# Patient Record
Sex: MALE | Race: WHITE
[De-identification: names, ages, dates, MRNs, and addresses within clinical notes are randomized per-mention and may not be internally consistent; named-entity substitution may affect disease eponyms.]

---

## 2016-02-02 VITALS
SYSTOLIC BLOOD PRESSURE: 167 MMHG | SYSTOLIC BLOOD PRESSURE: 167 MMHG | SYSTOLIC BLOOD PRESSURE: 167 MMHG | DIASTOLIC BLOOD PRESSURE: 51 MMHG | SYSTOLIC BLOOD PRESSURE: 167 MMHG | DIASTOLIC BLOOD PRESSURE: 51 MMHG | SYSTOLIC BLOOD PRESSURE: 167 MMHG | DIASTOLIC BLOOD PRESSURE: 51 MMHG | DIASTOLIC BLOOD PRESSURE: 51 MMHG | DIASTOLIC BLOOD PRESSURE: 51 MMHG

## 2017-01-17 ENCOUNTER — HOSPITAL ENCOUNTER (OUTPATIENT)
Dept: HOSPITAL 19 - ZLAB.WCH | Age: 82
End: 2017-01-17

## 2017-01-17 ENCOUNTER — HOSPITAL ENCOUNTER (OUTPATIENT)
Dept: HOSPITAL 6 - LAB | Age: 82
End: 2017-01-17
Payer: MEDICARE

## 2017-01-17 DIAGNOSIS — D64.89: Primary | ICD-10-CM

## 2017-01-17 DIAGNOSIS — E53.8: ICD-10-CM

## 2017-01-17 DIAGNOSIS — Z01.89: Primary | ICD-10-CM

## 2017-01-17 DIAGNOSIS — Z12.5: ICD-10-CM

## 2017-01-17 DIAGNOSIS — E78.2: ICD-10-CM

## 2017-01-17 DIAGNOSIS — I44.1: ICD-10-CM

## 2017-01-17 PROCEDURE — G0103 PSA SCREENING: HCPCS

## 2017-05-10 ENCOUNTER — HOSPITAL ENCOUNTER (OUTPATIENT)
Dept: HOSPITAL 6 - LAB | Age: 82
End: 2017-05-10
Payer: MEDICARE

## 2017-05-10 DIAGNOSIS — I70.213: Primary | ICD-10-CM

## 2017-05-16 ENCOUNTER — HOSPITAL ENCOUNTER (OUTPATIENT)
Dept: HOSPITAL 6 - LAB | Age: 82
End: 2017-05-16
Payer: MEDICARE

## 2017-05-16 DIAGNOSIS — I70.213: Primary | ICD-10-CM

## 2017-05-22 ENCOUNTER — HOSPITAL ENCOUNTER (OUTPATIENT)
Dept: HOSPITAL 6 - LAB | Age: 82
End: 2017-05-22
Payer: MEDICARE

## 2017-05-22 DIAGNOSIS — I70.213: Primary | ICD-10-CM

## 2017-12-15 ENCOUNTER — HOSPITAL ENCOUNTER (OUTPATIENT)
Dept: HOSPITAL 6 - LAB | Age: 82
End: 2017-12-15
Attending: INTERNAL MEDICINE
Payer: MEDICARE

## 2017-12-15 DIAGNOSIS — Z12.11: ICD-10-CM

## 2017-12-15 DIAGNOSIS — R73.09: ICD-10-CM

## 2017-12-15 DIAGNOSIS — E78.2: ICD-10-CM

## 2017-12-15 DIAGNOSIS — Z12.5: ICD-10-CM

## 2017-12-15 DIAGNOSIS — D64.9: ICD-10-CM

## 2017-12-15 DIAGNOSIS — R41.3: ICD-10-CM

## 2017-12-15 DIAGNOSIS — E53.8: ICD-10-CM

## 2017-12-15 DIAGNOSIS — F03.90: ICD-10-CM

## 2017-12-15 DIAGNOSIS — G93.40: Primary | ICD-10-CM

## 2017-12-15 LAB
ALBUMIN SERPL-MCNC: 4.1 G/DL (ref 3.5–5)
ALT SERPL-CCNC: 34 U/L (ref 21–72)
APPEARANCE UR: CLEAR
AST SERPL-CCNC: 56 U/L (ref 17–59)
BILIRUB SERPL-MCNC: 0.5 MG/DL (ref 0.2–1.3)
BILIRUB UR QL STRIP.AUTO: NEGATIVE
BUN/CREAT SERPL: 19.9 (ref 6–26)
CALCIUM SERPL-MCNC: 9.4 MG/DL (ref 8.4–10.2)
CO2 SERPL-SCNC: 31 MMOL/L (ref 22–30)
COLOR UR AUTO: YELLOW
ERYTHROCYTE [DISTWIDTH] IN BLOOD BY AUTOMATED COUNT: 12.3 % (ref 11.5–14.5)
ERYTHROCYTE [SEDIMENTATION RATE] IN BLOOD: 12 MM/HR (ref 0–20)
GLUCOSE SERPL-MCNC: 83 MG/DL (ref 75–110)
GLUCOSE UR QL STRIP.AUTO: NEGATIVE MG/DL
HCT VFR BLD AUTO: 39.3 % (ref 42–52)
HGB BLD-MCNC: 13 G/DL (ref 13.5–18)
KETONES UR QL STRIP.AUTO: NEGATIVE
LEUKOCYTE ESTERASE UR QL STRIP: NEGATIVE
LYMPHOCYTES NFR BLD MANUAL: 29 % (ref 20–51)
MCH RBC QN AUTO: 30 PG (ref 27–31)
MCHC RBC AUTO-ENTMCNC: 33 G/DL (ref 33–37)
MCV RBC AUTO: 92 FL (ref 78–100)
MONOCYTES NFR BLD: 9 % (ref 3–10)
NEUTS SEG NFR BLD MANUAL: 60 % (ref 42–75)
NITRITE UR QL STRIP: NEGATIVE
PH UR STRIP.AUTO: 5 [PH] (ref 5–8)
PLATELET # BLD AUTO: 200 K/MM3 (ref 130–400)
PMV BLD AUTO: 10.4 FL (ref 7.4–10.4)
POTASSIUM SERPL-SCNC: 4.6 MMOL/L (ref 3.6–5)
PROT ?TM UR-MCNC: NEGATIVE MG/DL
PROT SERPL-MCNC: 7.2 G/DL (ref 6.3–8.2)
RBC # BLD AUTO: 4.28 M/MM3 (ref 4.2–5.6)
RBC UR QL AUTO: NEGATIVE
SODIUM SERPL-SCNC: 141 MMOL/L (ref 137–145)
SP GR UR STRIP.AUTO: 1.02 (ref 1–1.03)
UROBILINOGEN UR-MCNC: NORMAL MG/DL
WBC # BLD AUTO: 5.3 K/MM3 (ref 4.8–10.8)
WBC #/AREA URNS HPF: (no result) /HPF (ref 0–3)

## 2017-12-16 ENCOUNTER — HOSPITAL ENCOUNTER (OUTPATIENT)
Dept: HOSPITAL 19 - ZLAB.WCH | Age: 82
End: 2017-12-16

## 2017-12-16 DIAGNOSIS — Z01.89: Primary | ICD-10-CM

## 2017-12-16 LAB
PSA SERPL-MCNC: 4.71 NG/ML (ref 0–4)
TSH SERPL DL<=0.005 MIU/L-ACNC: 2.07 UIU/ML (ref 0.47–4.68)

## 2017-12-16 PROCEDURE — G0103 PSA SCREENING: HCPCS

## 2018-12-18 ENCOUNTER — HOSPITAL ENCOUNTER (OUTPATIENT)
Dept: HOSPITAL 19 - ZLAB.WCH | Age: 83
End: 2018-12-18

## 2018-12-18 DIAGNOSIS — Z01.89: Primary | ICD-10-CM

## 2018-12-18 LAB
FERRITIN SERPL-MCNC: 44 NG/ML (ref 18–464)
IRON SERPL-MCNC: 80 UG/DL (ref 35–150)
PSA SERPL-MCNC: 5.06 NG/ML (ref 0–4)
TIBC SERPL-MCNC: 290 UG/DL (ref 261–462)

## 2018-12-18 PROCEDURE — G0103 PSA SCREENING: HCPCS

## 2019-01-28 ENCOUNTER — HOSPITAL ENCOUNTER (OUTPATIENT)
Dept: HOSPITAL 6 - LAB | Age: 84
End: 2019-01-28
Attending: INTERNAL MEDICINE
Payer: MEDICARE

## 2019-01-28 ENCOUNTER — HOSPITAL ENCOUNTER (OUTPATIENT)
Dept: HOSPITAL 19 - ZLAB.WCH | Age: 84
End: 2019-01-28

## 2019-01-28 DIAGNOSIS — Z01.89: Primary | ICD-10-CM

## 2019-01-28 DIAGNOSIS — F03.90: ICD-10-CM

## 2019-01-28 DIAGNOSIS — Z95.0: ICD-10-CM

## 2019-01-28 DIAGNOSIS — R06.02: Primary | ICD-10-CM

## 2019-01-28 DIAGNOSIS — G93.40: ICD-10-CM

## 2019-01-28 DIAGNOSIS — E03.4: ICD-10-CM

## 2019-01-28 DIAGNOSIS — I44.30: ICD-10-CM

## 2019-01-28 LAB
ALBUMIN SERPL-MCNC: 4.4 G/DL (ref 3.5–5)
ALT SERPL-CCNC: 27 U/L (ref 21–72)
APPEARANCE UR: CLEAR
AST SERPL-CCNC: 27 U/L (ref 17–59)
BASOPHILS # BLD: 0 10*3/UL (ref 0.02–0.1)
BILIRUB SERPL-MCNC: 0.4 MG/DL (ref 0.2–1.3)
BILIRUB UR QL STRIP.AUTO: NEGATIVE
CALCIUM SERPL-MCNC: 9.5 MG/DL (ref 8.4–10.2)
CO2 SERPL-SCNC: 29 MMOL/L (ref 22–30)
COLOR UR AUTO: YELLOW
EOSINOPHIL # BLD: 0.2 10*3/UL (ref 0.04–0.4)
EOSINOPHIL NFR BLD: 3.3 % (ref 0–4)
ERYTHROCYTE [DISTWIDTH] IN BLOOD BY AUTOMATED COUNT: 12.8 % (ref 11.5–14.5)
ERYTHROCYTE [SEDIMENTATION RATE] IN BLOOD: 10 MM/HR (ref 0–20)
GLUCOSE SERPL-MCNC: 114 MG/DL (ref 75–110)
GLUCOSE UR QL STRIP.AUTO: NEGATIVE MG/DL
HCT VFR BLD AUTO: 42.4 % (ref 42–52)
HGB BLD-MCNC: 13.6 G/DL (ref 13.5–18)
KETONES UR QL STRIP.AUTO: NEGATIVE
LEUKOCYTE ESTERASE UR QL STRIP: NEGATIVE
LYMPHOCYTES # BLD: 1.4 10*3/UL (ref 1.5–4)
MCH RBC QN AUTO: 29 PG (ref 27–31)
MCHC RBC AUTO-ENTMCNC: 32 G/DL (ref 33–37)
MCV RBC AUTO: 91 FL (ref 78–100)
MONOCYTES # BLD: 0.6 10*3/UL (ref 0.2–0.8)
NEUTROPHILS # BLD: 3 10*3/UL (ref 1.4–6.5)
NITRITE UR QL STRIP: NEGATIVE
PH UR STRIP.AUTO: 5 [PH] (ref 5–8)
PLATELET # BLD AUTO: 188 K/MM3 (ref 130–400)
PMV BLD AUTO: 10 FL (ref 7.4–10.4)
POTASSIUM SERPL-SCNC: 4.1 MMOL/L (ref 3.6–5)
PROT ?TM UR-MCNC: (no result) MG/DL
PROT SERPL-MCNC: 7.6 G/DL (ref 6.3–8.2)
RBC # BLD AUTO: 4.64 M/MM3 (ref 4.2–5.6)
RBC UR QL AUTO: (no result)
SODIUM SERPL-SCNC: 141 MMOL/L (ref 137–145)
SP GR UR STRIP.AUTO: 1.03 (ref 1–1.03)
UROBILINOGEN UR-MCNC: NORMAL MG/DL
WBC # BLD AUTO: 5.2 K/MM3 (ref 4.8–10.8)
WBC #/AREA URNS HPF: (no result) /HPF (ref 0–3)

## 2019-11-11 VITALS — SYSTOLIC BLOOD PRESSURE: 163 MMHG | DIASTOLIC BLOOD PRESSURE: 85 MMHG

## 2020-07-14 ENCOUNTER — HOSPITAL ENCOUNTER (OUTPATIENT)
Dept: HOSPITAL 6 - RAD | Age: 85
End: 2020-07-14
Payer: MEDICARE

## 2020-07-14 DIAGNOSIS — G45.9: Primary | ICD-10-CM

## 2020-07-28 ENCOUNTER — HOSPITAL ENCOUNTER (INPATIENT)
Dept: HOSPITAL 19 - SURG | Age: 85
LOS: 6 days | Discharge: NURSING, REHAB OR LONG-TERM CARE | DRG: 469 | End: 2020-08-03
Attending: INTERNAL MEDICINE | Admitting: INTERNAL MEDICINE
Payer: MEDICARE

## 2020-07-28 VITALS — SYSTOLIC BLOOD PRESSURE: 118 MMHG | DIASTOLIC BLOOD PRESSURE: 52 MMHG | HEART RATE: 72 BPM

## 2020-07-28 VITALS — DIASTOLIC BLOOD PRESSURE: 60 MMHG | HEART RATE: 71 BPM | SYSTOLIC BLOOD PRESSURE: 121 MMHG

## 2020-07-28 VITALS
DIASTOLIC BLOOD PRESSURE: 65 MMHG | SYSTOLIC BLOOD PRESSURE: 126 MMHG | SYSTOLIC BLOOD PRESSURE: 126 MMHG | DIASTOLIC BLOOD PRESSURE: 65 MMHG

## 2020-07-28 VITALS — HEART RATE: 64 BPM | DIASTOLIC BLOOD PRESSURE: 49 MMHG | SYSTOLIC BLOOD PRESSURE: 122 MMHG | TEMPERATURE: 98 F

## 2020-07-28 VITALS — HEART RATE: 72 BPM | SYSTOLIC BLOOD PRESSURE: 121 MMHG | TEMPERATURE: 98 F | DIASTOLIC BLOOD PRESSURE: 56 MMHG

## 2020-07-28 VITALS — DIASTOLIC BLOOD PRESSURE: 65 MMHG | SYSTOLIC BLOOD PRESSURE: 117 MMHG | HEART RATE: 96 BPM

## 2020-07-28 VITALS — DIASTOLIC BLOOD PRESSURE: 53 MMHG | SYSTOLIC BLOOD PRESSURE: 126 MMHG | HEART RATE: 86 BPM | TEMPERATURE: 99.8 F

## 2020-07-28 VITALS — TEMPERATURE: 98 F | DIASTOLIC BLOOD PRESSURE: 52 MMHG | SYSTOLIC BLOOD PRESSURE: 126 MMHG | HEART RATE: 72 BPM

## 2020-07-28 VITALS — DIASTOLIC BLOOD PRESSURE: 58 MMHG | HEART RATE: 77 BPM | SYSTOLIC BLOOD PRESSURE: 127 MMHG

## 2020-07-28 VITALS — HEART RATE: 63 BPM | SYSTOLIC BLOOD PRESSURE: 113 MMHG | DIASTOLIC BLOOD PRESSURE: 51 MMHG | TEMPERATURE: 98 F

## 2020-07-28 VITALS — HEIGHT: 60 IN | WEIGHT: 168.21 LBS

## 2020-07-28 VITALS — TEMPERATURE: 98.3 F | SYSTOLIC BLOOD PRESSURE: 106 MMHG | HEART RATE: 65 BPM | DIASTOLIC BLOOD PRESSURE: 50 MMHG

## 2020-07-28 DIAGNOSIS — N18.9: ICD-10-CM

## 2020-07-28 DIAGNOSIS — R50.9: ICD-10-CM

## 2020-07-28 DIAGNOSIS — S72.001A: Primary | ICD-10-CM

## 2020-07-28 DIAGNOSIS — F03.90: ICD-10-CM

## 2020-07-28 DIAGNOSIS — Z95.0: ICD-10-CM
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
## 2020-07-28 DIAGNOSIS — E44.0: ICD-10-CM

## 2020-07-28 DIAGNOSIS — N17.9: ICD-10-CM

## 2020-07-28 DIAGNOSIS — F32.9: ICD-10-CM

## 2020-07-28 DIAGNOSIS — I49.5: ICD-10-CM

## 2020-07-28 DIAGNOSIS — F41.9: ICD-10-CM

## 2020-07-28 DIAGNOSIS — J96.01: ICD-10-CM

## 2020-07-28 DIAGNOSIS — Z87.891: ICD-10-CM

## 2020-07-28 DIAGNOSIS — D64.9: ICD-10-CM

## 2020-07-28 PROCEDURE — C1713 ANCHOR/SCREW BN/BN,TIS/BN: HCPCS

## 2020-07-28 PROCEDURE — C1776 JOINT DEVICE (IMPLANTABLE): HCPCS

## 2020-07-28 PROCEDURE — P9016 RBC LEUKOCYTES REDUCED: HCPCS

## 2020-07-28 PROCEDURE — 0SRR0JA REPLACEMENT OF RIGHT HIP JOINT, FEMORAL SURFACE WITH SYNTHETIC SUBSTITUTE, UNCEMENTED, OPEN APPROACH: ICD-10-PCS | Performed by: ORTHOPAEDIC SURGERY

## 2020-07-28 PROCEDURE — A9284 NON-ELECTRONIC SPIROMETER: HCPCS

## 2020-07-28 NOTE — NUR
PT ADMITTED TO ROOM 345 PER EMS FROM [place]. CLEARED BY HOSPITALIST AND
NOTIFIED DR. HARPER. CONSENT OBTAINED FROM MARTINEZ BOWEN' WIFE AND DPOA. PT TO
SURGERY AT THIS TIME PER BED WITH MYLENE RN PACU.

## 2020-07-28 NOTE — NUR
PT TO ROOM 345 PER BED WITH REPORT FROM FABRICIO PENG @[id number]. PT IS DROWSEY BUT
AROUSES TO VERBAL, BASELINE DEMENTIA NOT ABLE TO ANSWER QUESTIONS. VSS,
DRESSING TO RIGHT HIP CDI WITH BULKY DRESSING IN PLACE.

## 2020-07-29 VITALS — DIASTOLIC BLOOD PRESSURE: 55 MMHG | HEART RATE: 83 BPM | SYSTOLIC BLOOD PRESSURE: 116 MMHG | TEMPERATURE: 100.4 F

## 2020-07-29 VITALS — HEART RATE: 104 BPM | TEMPERATURE: 98.1 F | DIASTOLIC BLOOD PRESSURE: 47 MMHG | SYSTOLIC BLOOD PRESSURE: 122 MMHG

## 2020-07-29 VITALS — SYSTOLIC BLOOD PRESSURE: 115 MMHG | HEART RATE: 70 BPM | DIASTOLIC BLOOD PRESSURE: 50 MMHG | TEMPERATURE: 99.1 F

## 2020-07-29 VITALS — HEART RATE: 72 BPM | SYSTOLIC BLOOD PRESSURE: 113 MMHG | TEMPERATURE: 98.1 F | DIASTOLIC BLOOD PRESSURE: 55 MMHG

## 2020-07-29 VITALS — SYSTOLIC BLOOD PRESSURE: 112 MMHG | HEART RATE: 110 BPM | TEMPERATURE: 100.4 F | DIASTOLIC BLOOD PRESSURE: 48 MMHG

## 2020-07-29 LAB
ANION GAP SERPL CALC-SCNC: 6 MMOL/L (ref 7–16)
BASOPHILS NFR BLD MANUAL: 1 % (ref 0–2)
BUN SERPL-MCNC: 21 MG/DL (ref 9–20)
CALCIUM SERPL-MCNC: 8.2 MG/DL (ref 8.4–10.2)
CHLORIDE SERPL-SCNC: 104 MMOL/L (ref 98–107)
CO2 SERPL-SCNC: 27 MMOL/L (ref 22–30)
CREAT SERPL-SCNC: 1.32 UMOL/L (ref 0.66–1.25)
EOSINOPHIL NFR BLD: 2 % (ref 0–4)
ERYTHROCYTE [DISTWIDTH] IN BLOOD BY AUTOMATED COUNT: 13.1 % (ref 11.5–14.5)
GLUCOSE SERPL-MCNC: 137 MG/DL (ref 74–106)
HCT VFR BLD AUTO: 30.7 % (ref 42–52)
HGB BLD-MCNC: 10 G/DL (ref 13.5–18)
LYMPHOCYTES NFR BLD MANUAL: 10 % (ref 20–51)
MCH RBC QN AUTO: 30 PG (ref 27–31)
MCHC RBC AUTO-ENTMCNC: 32 G/DL (ref 33–37)
MCV RBC AUTO: 94 FL (ref 80–100)
MONOCYTES NFR BLD: 10 % (ref 1.7–9.3)
NEUTS BAND NFR BLD: 11 % (ref 0–10)
NEUTS SEG NFR BLD MANUAL: 66 % (ref 42–75.2)
PLATELET # BLD AUTO: 144 K/MM3 (ref 130–400)
PLATELET BLD QL SMEAR: NORMAL
PMV BLD AUTO: 11.2 FL (ref 7.4–10.4)
POTASSIUM SERPL-SCNC: 4.4 MMOL/L (ref 3.4–5)
RBC # BLD AUTO: 3.32 M/MM3 (ref 4.2–5.6)
SODIUM SERPL-SCNC: 136 MMOL/L (ref 137–145)

## 2020-07-29 NOTE — NUR
assisted back to bed by physical therapy, wilburn catheter discontinued,
occlusive dressing to right hip removed, incision with staples and is CD&I,
covered with aquacel dressing, tolerated well but does c/o pain when being
moved, then rests quietly with eyes closed

## 2020-07-29 NOTE — NUR
The patient has dementia. LUCILLE contacted the patient's wife, Tatiana
(ph#[phone]), to discuss discharge plan. The patient resides at
[facility] for long-term care. His PCP is Dr. Fredy Burk and
his advanced directives are in EMR. His DPOA-HC is his wife. The patient had a
hip fracture. Tatiana reports that she would prefer for the patient to go to
[facility] for rehab upon discharge, due to her living in [place] and
them allowing visitors at this time. Tatiana reports that her and family would be
able to provide transportation. Tatiana states that if [facility] cannot accept
the patient, then she would be agreeable with the patient returning back to
[facility] for rehab. LUCILLE contacted and faxed a referral to
Eve at [facility]. Eve confirms that they are allowing visitors at this
time. LUCILLE contacted and updated Kirt at [facility]. Kirt reports that
they would be able to accept the patient back and that they would not require
another COVID test. LUCILLE faxed updates to [facility]. LUCILLE awaiting [facility] screen
and will continue to follow.

## 2020-07-29 NOTE — NUR
LUCILLE received a phone call from the patient's granddaughter, Olya
(ph#[phone]). Olya reports that the patient's wife and family actually
preferred [facility], not [facility]. They are aware that he cannot have
visitors at [facility], but still prefer it and that they have already been
in contact with Cynthia at [facility]. Olya states that the patient's wife
is also starting to have some confusion. LUCILLE contacted and faxed a referral to
Cynthia at [facility]. Cynthia reports that they would be glad to have the
patient and would be able to accept him on Friday. Cynthia reports that they
would require a new COVID test. LUCILLE notified the patient's PA. LUCILLE attempted to
contact and update Eve at [facility]. LUCILLE left her a voicemail. SW to contact
and update Kirt at [facility] and will continue to follow.

## 2020-07-29 NOTE — NUR
Patient has rested well throughout the night. Post-op vitals were within
normal limits. Patient doesn't talk much and will say 1-2 word answers to
questions. Patient was drowsy, but able to be aroused to verbal stimuli.
Medications crushed and put in chocolate pudding as this nurse was unsure how
well he swallowed. Patient tolerated this well. PRN Tramadol given for pain
d/t grimacing when repositioning. Bulky dressing noted to right hip. Harper
continues to drain clear, yellow urine. Escoriation noted to tip of penis d/t
skin cancer removal prior to this hospital stay. Per report, patient has
baseline dementia. Compliant with cares. Will continue to monitor patient.

## 2020-07-29 NOTE — NUR
Dr Kimbrough in to see patient, patient is sitting up in chair and assisting him
with eating lunch,does well with makenzie obrien taking liquids with assistance

## 2020-07-29 NOTE — NUR
Received report from Catherine Powell. Pt is currently sitting up in bed and his his
call light wtihin reach and his bed is in lowest position.

## 2020-07-29 NOTE — NUR
appears to be dozing, awakened for assessment, wliburn appears to have been
leaking and blankets and pillow between his legs are wet, incontinent care and
am hygiene completed, f ull assessment completed, CNA at bedside and will
assist him with having breakfast, c/o pain when rolled to side, repositioned
to left side

## 2020-07-30 VITALS — HEART RATE: 167 BPM | SYSTOLIC BLOOD PRESSURE: 87 MMHG | DIASTOLIC BLOOD PRESSURE: 37 MMHG | TEMPERATURE: 99.5 F

## 2020-07-30 VITALS — DIASTOLIC BLOOD PRESSURE: 57 MMHG | TEMPERATURE: 99.2 F | HEART RATE: 73 BPM | SYSTOLIC BLOOD PRESSURE: 99 MMHG

## 2020-07-30 VITALS — DIASTOLIC BLOOD PRESSURE: 48 MMHG | TEMPERATURE: 99.3 F | SYSTOLIC BLOOD PRESSURE: 123 MMHG | HEART RATE: 75 BPM

## 2020-07-30 VITALS — DIASTOLIC BLOOD PRESSURE: 46 MMHG | SYSTOLIC BLOOD PRESSURE: 109 MMHG | HEART RATE: 93 BPM | TEMPERATURE: 99.6 F

## 2020-07-30 VITALS — DIASTOLIC BLOOD PRESSURE: 96 MMHG | HEART RATE: 94 BPM | TEMPERATURE: 100.2 F | SYSTOLIC BLOOD PRESSURE: 149 MMHG

## 2020-07-30 VITALS — TEMPERATURE: 101.7 F | SYSTOLIC BLOOD PRESSURE: 139 MMHG | DIASTOLIC BLOOD PRESSURE: 72 MMHG | HEART RATE: 74 BPM

## 2020-07-30 VITALS — TEMPERATURE: 99.5 F | SYSTOLIC BLOOD PRESSURE: 122 MMHG | DIASTOLIC BLOOD PRESSURE: 68 MMHG | HEART RATE: 75 BPM

## 2020-07-30 VITALS — HEART RATE: 85 BPM | TEMPERATURE: 99.5 F | DIASTOLIC BLOOD PRESSURE: 47 MMHG | SYSTOLIC BLOOD PRESSURE: 111 MMHG

## 2020-07-30 VITALS — TEMPERATURE: 99 F

## 2020-07-30 VITALS — TEMPERATURE: 101.4 F

## 2020-07-30 VITALS — TEMPERATURE: 99.2 F

## 2020-07-30 LAB
ANION GAP SERPL CALC-SCNC: 4 MMOL/L (ref 7–16)
BUN SERPL-MCNC: 29 MG/DL (ref 9–20)
CALCIUM SERPL-MCNC: 7.9 MG/DL (ref 8.4–10.2)
CHLORIDE SERPL-SCNC: 108 MMOL/L (ref 98–107)
CO2 SERPL-SCNC: 26 MMOL/L (ref 22–30)
CREAT SERPL-SCNC: 1.55 UMOL/L (ref 0.66–1.25)
ERYTHROCYTE [DISTWIDTH] IN BLOOD BY AUTOMATED COUNT: 13.2 % (ref 11.5–14.5)
GLUCOSE SERPL-MCNC: 102 MG/DL (ref 74–106)
GRAN CASTS #/AREA URNS LPF: (no result) /LPF
HCT VFR BLD AUTO: 24.3 % (ref 42–52)
HGB BLD-MCNC: 7.9 G/DL (ref 13.5–18)
LYMPHOCYTES NFR BLD MANUAL: 8 % (ref 20–51)
MCH RBC QN AUTO: 30 PG (ref 27–31)
MCHC RBC AUTO-ENTMCNC: 33 G/DL (ref 33–37)
MCV RBC AUTO: 94 FL (ref 80–100)
MONOCYTES NFR BLD: 8 % (ref 1.7–9.3)
NEUTS BAND NFR BLD: 12 % (ref 0–10)
NEUTS SEG NFR BLD MANUAL: 72 % (ref 42–75.2)
PH UR STRIP.AUTO: 5 [PH] (ref 5–8)
PLATELET # BLD AUTO: 130 K/MM3 (ref 130–400)
PLATELET BLD QL SMEAR: NORMAL
PMV BLD AUTO: 10.7 FL (ref 7.4–10.4)
POTASSIUM SERPL-SCNC: 4.3 MMOL/L (ref 3.4–5)
RBC # BLD AUTO: 2.6 M/MM3 (ref 4.2–5.6)
RBC # UR: (no result) /HPF
SODIUM SERPL-SCNC: 137 MMOL/L (ref 137–145)
SP GR UR STRIP.AUTO: 1.02 (ref 1–1.03)
SQUAMOUS # URNS: (no result) /HPF
UA DIPSTICK PNL UR STRIP.AUTO: (no result)

## 2020-07-30 NOTE — NUR
LUCILLE staffed with the patient's PA. The patient will not be able to d/c
tomorrow. LUCILLE notified and emailed updates to Cynthia at [facility]. LUCILLE
contacted and updated the patient's wife and granddaughter, Olya. Olya
reports that the plan is to transition the patient into long-term care at
[facility]. She states that their family was not happy with the patient's
care at [facility]. The patient's wife reports that the patient does have
Medicaid. LUCILLE notified Financial Counselor, Deloris. Deloris confirms that he does
have Medicaid and will add it to his account. LUCILLE to email an updated facesheet
to Cynthia at [facility]. LUCILLE faxed updates to [facility]. LUCILLE to continue to
follow.

## 2020-07-30 NOTE — NUR
With morning vitals the pt temperature did come down to 99.5. In report I was
informed that the same thing happened with the pt the night before. Pt has his
call light within reach. Pt did not take his night medications because after
given pt a spoon of applesauce pt would not swallow. I did not give the pt the
medication because of the risk of him aspirating on the medication. I also
went back again and tried and thought it was best that the medication was not
given.

## 2020-07-30 NOTE — NUR
Patient in bed. Arouses to voice and touch. SCDS to BLE. Aquacel to right hip
is CDI. Will continue to monitor.

## 2020-07-30 NOTE — NUR
Pt is currently lying in bed sleeping. The aide reported to me that the pt has
a temp of 100.2. I notified my charge nurse at this time because I was in
with another pt and it would be a while before I was able to assess the pt.
GINETTE Bautista. gave him a Linn at that time.

## 2020-07-30 NOTE — NUR
Patient repositioned throughout the day. Assisted patient to eat meals.
Napping on and off through the day. Temp remains elevated this afternoon. Will
continue to monitor. WIll report off to night shift.

## 2020-07-31 VITALS — TEMPERATURE: 99.5 F | SYSTOLIC BLOOD PRESSURE: 116 MMHG | DIASTOLIC BLOOD PRESSURE: 49 MMHG | HEART RATE: 80 BPM

## 2020-07-31 VITALS — SYSTOLIC BLOOD PRESSURE: 125 MMHG | HEART RATE: 74 BPM | DIASTOLIC BLOOD PRESSURE: 55 MMHG | TEMPERATURE: 99.5 F

## 2020-07-31 VITALS — SYSTOLIC BLOOD PRESSURE: 127 MMHG | HEART RATE: 86 BPM | TEMPERATURE: 98.4 F | DIASTOLIC BLOOD PRESSURE: 53 MMHG

## 2020-07-31 VITALS — DIASTOLIC BLOOD PRESSURE: 61 MMHG | TEMPERATURE: 99.2 F | HEART RATE: 70 BPM | SYSTOLIC BLOOD PRESSURE: 126 MMHG

## 2020-07-31 VITALS — DIASTOLIC BLOOD PRESSURE: 80 MMHG | SYSTOLIC BLOOD PRESSURE: 108 MMHG | TEMPERATURE: 97.5 F | HEART RATE: 63 BPM

## 2020-07-31 LAB
ANION GAP SERPL CALC-SCNC: 2 MMOL/L (ref 7–16)
BASOPHILS # BLD: 0 10*3/UL (ref 0–0.2)
BASOPHILS NFR BLD AUTO: 0.1 % (ref 0–2)
BUN SERPL-MCNC: 28 MG/DL (ref 9–20)
CALCIUM SERPL-MCNC: 7.8 MG/DL (ref 8.4–10.2)
CHLORIDE SERPL-SCNC: 109 MMOL/L (ref 98–107)
CO2 SERPL-SCNC: 27 MMOL/L (ref 22–30)
CREAT SERPL-SCNC: 1.28 UMOL/L (ref 0.66–1.25)
EOSINOPHIL # BLD: 0.3 10*3/UL (ref 0–0.7)
EOSINOPHIL NFR BLD: 3.2 % (ref 0–4)
ERYTHROCYTE [DISTWIDTH] IN BLOOD BY AUTOMATED COUNT: 13.3 % (ref 11.5–14.5)
GLUCOSE SERPL-MCNC: 106 MG/DL (ref 74–106)
GRANULOCYTES # BLD AUTO: 74.3 % (ref 42.2–75.2)
HCT VFR BLD AUTO: 22.4 % (ref 42–52)
HGB BLD-MCNC: 7.1 G/DL (ref 13.5–18)
LYMPHOCYTES # BLD: 0.8 10*3/UL (ref 1.2–3.4)
LYMPHOCYTES NFR BLD: 9.3 % (ref 20–51)
MCH RBC QN AUTO: 30 PG (ref 27–31)
MCHC RBC AUTO-ENTMCNC: 32 G/DL (ref 33–37)
MCV RBC AUTO: 95 FL (ref 80–100)
MONOCYTES # BLD: 1.1 10*3/UL (ref 0.1–0.6)
MONOCYTES NFR BLD AUTO: 12.5 % (ref 1.7–9.3)
NEUTROPHILS # BLD: 6.6 10*3/UL (ref 1.4–6.5)
PLATELET # BLD AUTO: 131 K/MM3 (ref 130–400)
PMV BLD AUTO: 11 FL (ref 7.4–10.4)
POTASSIUM SERPL-SCNC: 4.1 MMOL/L (ref 3.4–5)
RBC # BLD AUTO: 2.37 M/MM3 (ref 4.2–5.6)
SODIUM SERPL-SCNC: 138 MMOL/L (ref 137–145)
URN COLLECT METHOD CLASS: (no result)

## 2020-07-31 NOTE — NUR
During vitals rounds, CNA notified this nurse of low blood pressure and high
pulse. Manual blood pressure was taken and noted to be within normal limits.
Telemetry ordered by Coral Morales, along with an EKG. Straight cath
completed and urine sample sent to lab. Patient tolerated this well. 100ml of
urine out. Aquacel to right thigh removed per Coral Morales, OGNZALEZ. Edema
noted to right thigh, lower back, and abdomen/groin. Redness and warmth
present as well. Coral aware. New dressing applied to site. Moderate amount
of serosangiuous drainage noted to old dressing. Staples intact. Patient noted
to grimace with repositioning. PRN Norco given with evening medications. Meds
crushed with pudding. Tolerated this well. Denies any further needs. Will
continue to monitor.

## 2020-07-31 NOTE — NUR
Pt. laying in bed at this time. Pt. is confused.  Pt. does respond to "yes/no"
questions and will follow directions.  INT to lt. wrist patent.  Pt. grimices
on occasion.  Will give pain meds with evening meds.  Pt. denies further
needs, call light within reach.

## 2020-07-31 NOTE — NUR
emailed updates to Cynthia at [facility]. Cynthia inquired about
discharge date and advised weekend admits are difficult for them as they do
not have an admitting physician. LUCILLE collaborated with BROOKS Bustamante who advised
discharge date would likely be Monday. LUCILLE provided this update to Cyntiha at .
LUCILLE contacted patient's granddaughter, Olya to provide update. LUCILLE will
continue to follow.

## 2020-07-31 NOTE — NUR
Patient has been very sleepy this morning. He did not eat much today. Was able
to get him to drink more liquids today. He was having a hard time this am with
eating and food intake. He was falling aleep during breakfast. Dressing to
right hip is C/D/I. There continues to be reddsess to the area, but no
drainage today. Patient sat up in the chair for a few hours with lunch. No
other changes at this time. Dr Blackburn seen patient afer lunch and stated he
was not worried about the reddness at this time. Call light within reach.
Bedalarm on.

## 2020-08-01 VITALS — DIASTOLIC BLOOD PRESSURE: 41 MMHG | HEART RATE: 71 BPM | TEMPERATURE: 98.4 F | SYSTOLIC BLOOD PRESSURE: 106 MMHG

## 2020-08-01 VITALS — HEART RATE: 72 BPM | DIASTOLIC BLOOD PRESSURE: 62 MMHG | TEMPERATURE: 98.3 F | SYSTOLIC BLOOD PRESSURE: 106 MMHG

## 2020-08-01 VITALS — DIASTOLIC BLOOD PRESSURE: 57 MMHG | TEMPERATURE: 98.6 F | SYSTOLIC BLOOD PRESSURE: 130 MMHG | HEART RATE: 60 BPM

## 2020-08-01 VITALS — DIASTOLIC BLOOD PRESSURE: 66 MMHG | TEMPERATURE: 98.2 F | HEART RATE: 72 BPM | SYSTOLIC BLOOD PRESSURE: 103 MMHG

## 2020-08-01 VITALS — HEART RATE: 67 BPM | DIASTOLIC BLOOD PRESSURE: 45 MMHG | SYSTOLIC BLOOD PRESSURE: 133 MMHG | TEMPERATURE: 98.3 F

## 2020-08-01 VITALS — TEMPERATURE: 98.1 F | SYSTOLIC BLOOD PRESSURE: 105 MMHG | DIASTOLIC BLOOD PRESSURE: 51 MMHG | HEART RATE: 70 BPM

## 2020-08-01 VITALS — HEART RATE: 62 BPM | SYSTOLIC BLOOD PRESSURE: 101 MMHG | TEMPERATURE: 98.4 F | DIASTOLIC BLOOD PRESSURE: 53 MMHG

## 2020-08-01 VITALS — SYSTOLIC BLOOD PRESSURE: 114 MMHG | TEMPERATURE: 98.4 F | HEART RATE: 67 BPM | DIASTOLIC BLOOD PRESSURE: 46 MMHG

## 2020-08-01 VITALS — SYSTOLIC BLOOD PRESSURE: 114 MMHG | TEMPERATURE: 98.3 F | HEART RATE: 69 BPM | DIASTOLIC BLOOD PRESSURE: 55 MMHG

## 2020-08-01 VITALS — DIASTOLIC BLOOD PRESSURE: 48 MMHG | TEMPERATURE: 98.1 F | HEART RATE: 60 BPM | SYSTOLIC BLOOD PRESSURE: 109 MMHG

## 2020-08-01 VITALS — DIASTOLIC BLOOD PRESSURE: 48 MMHG | SYSTOLIC BLOOD PRESSURE: 109 MMHG | HEART RATE: 60 BPM | TEMPERATURE: 98.1 F

## 2020-08-01 VITALS — DIASTOLIC BLOOD PRESSURE: 47 MMHG | HEART RATE: 67 BPM | TEMPERATURE: 98.1 F | SYSTOLIC BLOOD PRESSURE: 116 MMHG

## 2020-08-01 LAB
ANION GAP SERPL CALC-SCNC: 4 MMOL/L (ref 7–16)
BASOPHILS # BLD: 0 10*3/UL (ref 0–0.2)
BASOPHILS NFR BLD AUTO: 0.3 % (ref 0–2)
BUN SERPL-MCNC: 26 MG/DL (ref 9–20)
CALCIUM SERPL-MCNC: 8 MG/DL (ref 8.4–10.2)
CHLORIDE SERPL-SCNC: 109 MMOL/L (ref 98–107)
CO2 SERPL-SCNC: 27 MMOL/L (ref 22–30)
CREAT SERPL-SCNC: 1.18 UMOL/L (ref 0.66–1.25)
EOSINOPHIL # BLD: 0.3 10*3/UL (ref 0–0.7)
EOSINOPHIL NFR BLD: 4.5 % (ref 0–4)
ERYTHROCYTE [DISTWIDTH] IN BLOOD BY AUTOMATED COUNT: 13.4 % (ref 11.5–14.5)
GLUCOSE SERPL-MCNC: 102 MG/DL (ref 74–106)
GRANULOCYTES # BLD AUTO: 70.2 % (ref 42.2–75.2)
HCT VFR BLD AUTO: 22.3 % (ref 42–52)
HGB BLD-MCNC: 7 G/DL (ref 13.5–18)
LYMPHOCYTES # BLD: 0.9 10*3/UL (ref 1.2–3.4)
LYMPHOCYTES NFR BLD: 11.4 % (ref 20–51)
MAGNESIUM SERPL-MCNC: 2.3 MG/DL (ref 1.6–2.3)
MCH RBC QN AUTO: 30 PG (ref 27–31)
MCHC RBC AUTO-ENTMCNC: 31 G/DL (ref 33–37)
MCV RBC AUTO: 95 FL (ref 80–100)
MONOCYTES # BLD: 1 10*3/UL (ref 0.1–0.6)
MONOCYTES NFR BLD AUTO: 13.3 % (ref 1.7–9.3)
NEUTROPHILS # BLD: 5.3 10*3/UL (ref 1.4–6.5)
PLATELET # BLD AUTO: 156 K/MM3 (ref 130–400)
PMV BLD AUTO: 10.7 FL (ref 7.4–10.4)
POTASSIUM SERPL-SCNC: 3.9 MMOL/L (ref 3.4–5)
RBC # BLD AUTO: 2.36 M/MM3 (ref 4.2–5.6)
SODIUM SERPL-SCNC: 140 MMOL/L (ref 137–145)

## 2020-08-01 NOTE — NUR
Right hip aquacell saturated with serosanguinous drainage. Incision line CDI.
New aquacell dressing applied. Moderated edema right hip. Denied pain. One
unit of blood given. VSS. Assisted with meals.

## 2020-08-02 VITALS — SYSTOLIC BLOOD PRESSURE: 119 MMHG | HEART RATE: 69 BPM | DIASTOLIC BLOOD PRESSURE: 57 MMHG | TEMPERATURE: 97.8 F

## 2020-08-02 VITALS — HEART RATE: 66 BPM | TEMPERATURE: 98.4 F | SYSTOLIC BLOOD PRESSURE: 126 MMHG | DIASTOLIC BLOOD PRESSURE: 68 MMHG

## 2020-08-02 VITALS — HEART RATE: 65 BPM | SYSTOLIC BLOOD PRESSURE: 128 MMHG | DIASTOLIC BLOOD PRESSURE: 58 MMHG | TEMPERATURE: 97.8 F

## 2020-08-02 VITALS — SYSTOLIC BLOOD PRESSURE: 119 MMHG | TEMPERATURE: 97.7 F | HEART RATE: 66 BPM | DIASTOLIC BLOOD PRESSURE: 71 MMHG

## 2020-08-02 VITALS — SYSTOLIC BLOOD PRESSURE: 99 MMHG | HEART RATE: 70 BPM | TEMPERATURE: 99.3 F | DIASTOLIC BLOOD PRESSURE: 45 MMHG

## 2020-08-02 VITALS — SYSTOLIC BLOOD PRESSURE: 114 MMHG | TEMPERATURE: 97.4 F | HEART RATE: 70 BPM | DIASTOLIC BLOOD PRESSURE: 58 MMHG

## 2020-08-02 LAB
ANION GAP SERPL CALC-SCNC: 4 MMOL/L (ref 7–16)
BASOPHILS # BLD: 0 10*3/UL (ref 0–0.2)
BASOPHILS NFR BLD AUTO: 0.4 % (ref 0–2)
BUN SERPL-MCNC: 24 MG/DL (ref 9–20)
CALCIUM SERPL-MCNC: 8.4 MG/DL (ref 8.4–10.2)
CHLORIDE SERPL-SCNC: 108 MMOL/L (ref 98–107)
CO2 SERPL-SCNC: 29 MMOL/L (ref 22–30)
CREAT SERPL-SCNC: 1.09 UMOL/L (ref 0.66–1.25)
EOSINOPHIL # BLD: 0.4 10*3/UL (ref 0–0.7)
EOSINOPHIL NFR BLD: 5.4 % (ref 0–4)
ERYTHROCYTE [DISTWIDTH] IN BLOOD BY AUTOMATED COUNT: 13.3 % (ref 11.5–14.5)
GLUCOSE SERPL-MCNC: 89 MG/DL (ref 74–106)
GRANULOCYTES # BLD AUTO: 66.1 % (ref 42.2–75.2)
HCT VFR BLD AUTO: 26.7 % (ref 42–52)
HGB BLD-MCNC: 8.5 G/DL (ref 13.5–18)
LYMPHOCYTES # BLD: 0.8 10*3/UL (ref 1.2–3.4)
LYMPHOCYTES NFR BLD: 11.7 % (ref 20–51)
MCH RBC QN AUTO: 30 PG (ref 27–31)
MCHC RBC AUTO-ENTMCNC: 32 G/DL (ref 33–37)
MCV RBC AUTO: 94 FL (ref 80–100)
MONOCYTES # BLD: 1.1 10*3/UL (ref 0.1–0.6)
MONOCYTES NFR BLD AUTO: 16 % (ref 1.7–9.3)
NEUTROPHILS # BLD: 4.4 10*3/UL (ref 1.4–6.5)
PLATELET # BLD AUTO: 177 K/MM3 (ref 130–400)
PMV BLD AUTO: 10.9 FL (ref 7.4–10.4)
POTASSIUM SERPL-SCNC: 4 MMOL/L (ref 3.4–5)
RBC # BLD AUTO: 2.85 M/MM3 (ref 4.2–5.6)
SODIUM SERPL-SCNC: 141 MMOL/L (ref 137–145)

## 2020-08-02 NOTE — NUR
Drowsy. Denies pain. Right hip aquacell dressing with moderate amount
drainage. Ice pack to hip. States wants to eat later.

## 2020-08-02 NOTE — NUR
Complained of pain in left foot when repositioned. PAULETTE hose removed. Pressure
ulcer noted on left heel. Skin intact. Heel protectors applied to heels.

## 2020-08-03 VITALS — DIASTOLIC BLOOD PRESSURE: 67 MMHG | TEMPERATURE: 97.6 F | SYSTOLIC BLOOD PRESSURE: 110 MMHG | HEART RATE: 72 BPM

## 2020-08-03 VITALS — SYSTOLIC BLOOD PRESSURE: 113 MMHG | DIASTOLIC BLOOD PRESSURE: 45 MMHG | TEMPERATURE: 98.4 F | HEART RATE: 59 BPM

## 2020-08-03 VITALS — HEART RATE: 65 BPM | DIASTOLIC BLOOD PRESSURE: 59 MMHG | TEMPERATURE: 98.4 F | SYSTOLIC BLOOD PRESSURE: 104 MMHG

## 2020-08-03 VITALS — SYSTOLIC BLOOD PRESSURE: 104 MMHG | TEMPERATURE: 99.3 F | HEART RATE: 70 BPM | DIASTOLIC BLOOD PRESSURE: 50 MMHG

## 2020-08-03 LAB
ANION GAP SERPL CALC-SCNC: 4 MMOL/L (ref 7–16)
BASOPHILS # BLD: 0 10*3/UL (ref 0–0.2)
BASOPHILS NFR BLD AUTO: 0.4 % (ref 0–2)
BUN SERPL-MCNC: 25 MG/DL (ref 9–20)
CALCIUM SERPL-MCNC: 8.1 MG/DL (ref 8.4–10.2)
CHLORIDE SERPL-SCNC: 107 MMOL/L (ref 98–107)
CO2 SERPL-SCNC: 29 MMOL/L (ref 22–30)
CREAT SERPL-SCNC: 1.05 UMOL/L (ref 0.66–1.25)
EOSINOPHIL # BLD: 0.4 10*3/UL (ref 0–0.7)
EOSINOPHIL NFR BLD: 4.8 % (ref 0–4)
ERYTHROCYTE [DISTWIDTH] IN BLOOD BY AUTOMATED COUNT: 13.1 % (ref 11.5–14.5)
GLUCOSE SERPL-MCNC: 98 MG/DL (ref 74–106)
GRANULOCYTES # BLD AUTO: 67.8 % (ref 42.2–75.2)
HCT VFR BLD AUTO: 24.9 % (ref 42–52)
HGB BLD-MCNC: 8 G/DL (ref 13.5–18)
LYMPHOCYTES # BLD: 0.9 10*3/UL (ref 1.2–3.4)
LYMPHOCYTES NFR BLD: 12.5 % (ref 20–51)
MCH RBC QN AUTO: 30 PG (ref 27–31)
MCHC RBC AUTO-ENTMCNC: 32 G/DL (ref 33–37)
MCV RBC AUTO: 93 FL (ref 80–100)
MONOCYTES # BLD: 1.1 10*3/UL (ref 0.1–0.6)
MONOCYTES NFR BLD AUTO: 14.2 % (ref 1.7–9.3)
NEUTROPHILS # BLD: 5.1 10*3/UL (ref 1.4–6.5)
PLATELET # BLD AUTO: 205 K/MM3 (ref 130–400)
PMV BLD AUTO: 11 FL (ref 7.4–10.4)
POTASSIUM SERPL-SCNC: 3.9 MMOL/L (ref 3.4–5)
RBC # BLD AUTO: 2.68 M/MM3 (ref 4.2–5.6)
SODIUM SERPL-SCNC: 140 MMOL/L (ref 137–145)

## 2020-08-03 NOTE — NUR
Patient resting in bed at this time. Patient sleeping but rouses easily.
Patient is alert and responsive while awake but not oriented, advanced
dementia per baseline. Patient ate breakfast well with assistance. Medications
given with applesauce, patient tolerated well. Call light within reach, bed
alarm on, patient visible from desk.

## 2020-08-03 NOTE — NUR
The patient is to discharge today, 8/3 to [facility] [place] [facility] for post acute rehab.
The patient will be transported at noon. The team was in agreeance. Social
Worker contacted the patient's wife, Tatiana she was in agreeance. LUCILLE presented
the IM form to Tatiana.  She verbalized understanding and gave LUCILLE permission to
sign on her behalf. A copy was provided to the patient and the original was
placed in the chart. LUCILLE faxed discharge orders. There are no additional needs
at this time.

## 2020-08-03 NOTE — NUR
Report called to recieveing facility, discharge instructions, discharge
appointments, and eating, swallowing precautions discussed. Questions asked
and answered. Wife informed of discharge. Patient given a bed bath,
incontinent care provided. Patient helped into a wheelchair and taken to
Patient entrance where he entered SNF transportaion.

## 2020-08-05 ENCOUNTER — HOSPITAL ENCOUNTER (EMERGENCY)
Dept: HOSPITAL 6 - ED | Age: 85
Discharge: HOME | End: 2020-08-05
Payer: MEDICARE

## 2020-08-05 ENCOUNTER — HOSPITAL ENCOUNTER (OUTPATIENT)
Dept: HOSPITAL 6 - LAB | Age: 85
End: 2020-08-05
Attending: FAMILY MEDICINE
Payer: MEDICARE

## 2020-08-05 VITALS — HEIGHT: 72.01 IN | BODY MASS INDEX: 22.78 KG/M2 | WEIGHT: 168.21 LBS

## 2020-08-05 DIAGNOSIS — N18.2: Primary | ICD-10-CM

## 2020-08-05 DIAGNOSIS — Z72.9: Primary | ICD-10-CM

## 2020-08-06 ENCOUNTER — HOSPITAL ENCOUNTER (OUTPATIENT)
Dept: HOSPITAL 6 - AMSURD | Age: 85
End: 2020-08-06
Attending: FAMILY MEDICINE
Payer: MEDICARE

## 2020-08-06 VITALS — DIASTOLIC BLOOD PRESSURE: 65 MMHG | SYSTOLIC BLOOD PRESSURE: 137 MMHG

## 2020-08-06 VITALS — DIASTOLIC BLOOD PRESSURE: 68 MMHG | SYSTOLIC BLOOD PRESSURE: 133 MMHG

## 2020-08-06 VITALS — SYSTOLIC BLOOD PRESSURE: 124 MMHG | DIASTOLIC BLOOD PRESSURE: 65 MMHG

## 2020-08-06 VITALS — SYSTOLIC BLOOD PRESSURE: 141 MMHG | DIASTOLIC BLOOD PRESSURE: 67 MMHG

## 2020-08-06 VITALS — DIASTOLIC BLOOD PRESSURE: 68 MMHG | SYSTOLIC BLOOD PRESSURE: 140 MMHG

## 2020-08-06 VITALS — SYSTOLIC BLOOD PRESSURE: 143 MMHG | DIASTOLIC BLOOD PRESSURE: 60 MMHG

## 2020-08-06 VITALS — DIASTOLIC BLOOD PRESSURE: 61 MMHG | SYSTOLIC BLOOD PRESSURE: 141 MMHG

## 2020-08-06 VITALS — DIASTOLIC BLOOD PRESSURE: 84 MMHG | SYSTOLIC BLOOD PRESSURE: 145 MMHG

## 2020-08-06 VITALS — SYSTOLIC BLOOD PRESSURE: 141 MMHG | DIASTOLIC BLOOD PRESSURE: 75 MMHG

## 2020-08-06 VITALS — BODY MASS INDEX: 23.62 KG/M2 | HEIGHT: 72.01 IN | WEIGHT: 174.39 LBS

## 2020-08-06 VITALS — DIASTOLIC BLOOD PRESSURE: 95 MMHG | SYSTOLIC BLOOD PRESSURE: 127 MMHG

## 2020-08-06 VITALS — SYSTOLIC BLOOD PRESSURE: 146 MMHG | DIASTOLIC BLOOD PRESSURE: 61 MMHG

## 2020-08-06 VITALS — SYSTOLIC BLOOD PRESSURE: 135 MMHG | DIASTOLIC BLOOD PRESSURE: 76 MMHG

## 2020-08-06 VITALS — DIASTOLIC BLOOD PRESSURE: 75 MMHG | SYSTOLIC BLOOD PRESSURE: 132 MMHG

## 2020-08-06 VITALS — SYSTOLIC BLOOD PRESSURE: 130 MMHG | DIASTOLIC BLOOD PRESSURE: 61 MMHG

## 2020-08-06 DIAGNOSIS — D64.9: Primary | ICD-10-CM

## 2020-08-06 PROCEDURE — P9016 RBC LEUKOCYTES REDUCED: HCPCS

## 2020-08-06 NOTE — NUR
BLOOD TRANSFUSION EDUCATION PROVIDED TO WIFE AND PATIENT. WIFE SIGNS CONSENT.
PATIENT RECENTLY HAD A RT HIP REPLACEMENT AND LIVES AT [facility]. 2 STAFF
ASSIST TO TRANSFER FROM W/C TO BED. PATIENT DROWSY. RESPONSIVE TO QUESTIONS
AND FOLLOWS INSTRUCTION.

## 2020-08-06 NOTE — NUR
BLOOD TRANSFUSION STARTED AT 1555. AT THIS TIME, PATIENT DENIES ITCHING, PAIN,
AND SHORTNESS OF BREATH. CONTINUES TO REST INTERMITTENTLY IN BED. WIFE SITTING
IN CHAIR NEXT TO BED WITH TV ON.

## 2020-08-06 NOTE — NUR
Shift change assessment complete. RR regular, unlabored, clear. HR regular. Lt
leg has some pitting edema. According to wife it has been looking like that
since the hip surgery. Pt is resting. Blood infusing through a 20 guage in Rt
forearm. Site is clean dry and intact without phlebitis or infiltrate.

## 2020-08-06 NOTE — NUR
2014 Total two person transfer from bed to wheel chair. Alert to self and
wife. Wife at bed side. Tolerated transfer without difficulty. Foot protectors
applied to pt's feet.
2020 Roxanne GARY from [facility] here to take pt home.

## 2020-10-24 ENCOUNTER — HOSPITAL ENCOUNTER (EMERGENCY)
Dept: HOSPITAL 6 - ED | Age: 85
Discharge: OTHER INSTITUTION | End: 2020-10-24
Payer: MEDICARE

## 2020-10-24 ENCOUNTER — HOSPITAL ENCOUNTER (INPATIENT)
Dept: HOSPITAL 6 - MED/SURG | Age: 85
LOS: 9 days | Discharge: NURSING, REHAB OR LONG-TERM CARE | DRG: 178 | End: 2020-11-02
Attending: GENERAL PRACTICE | Admitting: GENERAL PRACTICE
Payer: MEDICARE

## 2020-10-24 VITALS — HEIGHT: 60 IN | WEIGHT: 174.39 LBS

## 2020-10-24 VITALS — SYSTOLIC BLOOD PRESSURE: 116 MMHG | DIASTOLIC BLOOD PRESSURE: 58 MMHG

## 2020-10-24 VITALS — BODY MASS INDEX: 25.03 KG/M2 | HEIGHT: 70 IN | WEIGHT: 174.83 LBS

## 2020-10-24 DIAGNOSIS — L89.619: ICD-10-CM

## 2020-10-24 DIAGNOSIS — F03.90: ICD-10-CM

## 2020-10-24 DIAGNOSIS — Z23: ICD-10-CM

## 2020-10-24 DIAGNOSIS — N17.9: ICD-10-CM

## 2020-10-24 DIAGNOSIS — Z79.82: ICD-10-CM

## 2020-10-24 DIAGNOSIS — L89.612: ICD-10-CM

## 2020-10-24 DIAGNOSIS — K59.09: ICD-10-CM

## 2020-10-24 DIAGNOSIS — R11.10: ICD-10-CM

## 2020-10-24 DIAGNOSIS — L89.622: ICD-10-CM

## 2020-10-24 DIAGNOSIS — J69.0: Primary | ICD-10-CM

## 2020-10-24 DIAGNOSIS — R13.10: ICD-10-CM

## 2020-10-24 DIAGNOSIS — Z79.1: ICD-10-CM

## 2020-10-24 DIAGNOSIS — Z79.891: ICD-10-CM

## 2020-10-24 DIAGNOSIS — N18.9: ICD-10-CM

## 2020-10-24 DIAGNOSIS — R06.00: ICD-10-CM

## 2020-10-24 DIAGNOSIS — Z95.0: ICD-10-CM

## 2020-10-24 DIAGNOSIS — T17.900A: Primary | ICD-10-CM

## 2020-10-24 DIAGNOSIS — G20: ICD-10-CM

## 2020-10-24 DIAGNOSIS — Z20.828: ICD-10-CM

## 2020-10-24 DIAGNOSIS — R09.02: ICD-10-CM

## 2020-10-24 DIAGNOSIS — K21.9: ICD-10-CM

## 2020-10-24 DIAGNOSIS — R50.9: ICD-10-CM

## 2020-10-24 LAB
ALBUMIN SERPL-MCNC: 3.5 G/DL (ref 3.4–4.8)
ALT SERPL-CCNC: 25 U/L (ref 0–55)
APPEARANCE UR: CLEAR
AST SERPL-CCNC: 26 U/L (ref 5–34)
BASOPHILS # BLD: 0 10*3/UL (ref 0.02–0.1)
BILIRUB SERPL-MCNC: 0.3 MG/DL (ref 0.2–1.2)
BILIRUB UR QL STRIP.AUTO: NEGATIVE
CALCIUM SERPL-MCNC: 8.6 MG/DL (ref 8.3–10.5)
CO2 SERPL-SCNC: 18 MMOL/L (ref 23–31)
COLOR UR AUTO: YELLOW
EOSINOPHIL # BLD: 0.2 10*3/UL (ref 0.04–0.4)
EOSINOPHIL NFR BLD: 1.7 % (ref 0–4)
ERYTHROCYTE [DISTWIDTH] IN BLOOD BY AUTOMATED COUNT: 14.8 % (ref 11.5–14.5)
GLUCOSE SERPL-MCNC: 133 MG/DL (ref 75–110)
GLUCOSE UR QL STRIP.AUTO: NEGATIVE MG/DL
HCT VFR BLD AUTO: 39.3 % (ref 42–52)
HGB BLD-MCNC: 12.4 G/DL (ref 13.5–18)
KETONES UR QL STRIP.AUTO: NEGATIVE
LEUKOCYTE ESTERASE UR QL STRIP: NEGATIVE
LYMPHOCYTES # BLD: 2.2 10*3/UL (ref 1.5–4)
MCH RBC QN AUTO: 29 PG (ref 27–31)
MCHC RBC AUTO-ENTMCNC: 32 G/DL (ref 33–37)
MCV RBC AUTO: 92 FL (ref 78–100)
MONOCYTES # BLD: 0.5 10*3/UL (ref 0.2–0.8)
MUCOUS THREADS URNS QL MICRO: PRESENT
NEUTROPHILS # BLD: 9.7 10*3/UL (ref 1.4–6.5)
NITRITE UR QL STRIP: NEGATIVE
PH UR STRIP.AUTO: 5 [PH] (ref 5–8)
PLATELET # BLD AUTO: 246 K/MM3 (ref 130–400)
PMV BLD AUTO: 9.7 FL (ref 7.4–10.4)
POTASSIUM SERPL-SCNC: 3.9 MMOL/L (ref 3.5–5.1)
PROT ?TM UR-MCNC: (no result) MG/DL
PROT SERPL-MCNC: 6.8 G/DL (ref 6.2–8.1)
RBC # BLD AUTO: 4.29 M/MM3 (ref 4.2–5.6)
RBC UR QL AUTO: (no result)
SODIUM SERPL-SCNC: 142 MMOL/L (ref 136–145)
SP GR UR STRIP.AUTO: 1.02 (ref 1–1.03)
TROPONIN I SERPL-MCNC: < 0.03 NG/ML (ref ?–0.03)
UROBILINOGEN UR-MCNC: NORMAL MG/DL
WBC # BLD AUTO: 12.6 K/MM3 (ref 4.8–10.8)
WBC #/AREA URNS HPF: (no result) /HPF (ref 0–3)

## 2020-10-25 VITALS — SYSTOLIC BLOOD PRESSURE: 105 MMHG | DIASTOLIC BLOOD PRESSURE: 52 MMHG

## 2020-10-25 VITALS — SYSTOLIC BLOOD PRESSURE: 106 MMHG | DIASTOLIC BLOOD PRESSURE: 48 MMHG

## 2020-10-25 VITALS — DIASTOLIC BLOOD PRESSURE: 59 MMHG | SYSTOLIC BLOOD PRESSURE: 111 MMHG

## 2020-10-25 VITALS — SYSTOLIC BLOOD PRESSURE: 84 MMHG | DIASTOLIC BLOOD PRESSURE: 46 MMHG

## 2020-10-25 VITALS — DIASTOLIC BLOOD PRESSURE: 56 MMHG | SYSTOLIC BLOOD PRESSURE: 112 MMHG

## 2020-10-25 VITALS — SYSTOLIC BLOOD PRESSURE: 136 MMHG | DIASTOLIC BLOOD PRESSURE: 75 MMHG

## 2020-10-25 LAB
CALCIUM SERPL-MCNC: 8.8 MG/DL (ref 8.3–10.5)
CO2 SERPL-SCNC: 20 MMOL/L (ref 23–31)
ERYTHROCYTE [DISTWIDTH] IN BLOOD BY AUTOMATED COUNT: 14.7 % (ref 11.5–14.5)
GLUCOSE SERPL-MCNC: 171 MG/DL (ref 75–110)
HCT VFR BLD AUTO: 35 % (ref 42–52)
HGB BLD-MCNC: 11.3 G/DL (ref 13.5–18)
LYMPHOCYTES NFR BLD MANUAL: 9 % (ref 20–51)
MCH RBC QN AUTO: 30 PG (ref 27–31)
MCHC RBC AUTO-ENTMCNC: 32 G/DL (ref 33–37)
MCV RBC AUTO: 92 FL (ref 78–100)
METAMYELOCYTES NFR BLD MANUAL: 3 % (ref 0–0)
MONOCYTES NFR BLD: 2 % (ref 3–10)
NEUTS BAND NFR BLD: 31 % (ref 0–10)
NEUTS SEG NFR BLD MANUAL: 55 % (ref 42–75)
NRBC BLD AUTO-RTO: 1 % (ref 0–6)
PLATELET # BLD AUTO: 201 K/MM3 (ref 130–400)
PMV BLD AUTO: 9.7 FL (ref 7.4–10.4)
POTASSIUM SERPL-SCNC: 4.2 MMOL/L (ref 3.5–5.1)
RBC # BLD AUTO: 3.79 M/MM3 (ref 4.2–5.6)
SODIUM SERPL-SCNC: 141 MMOL/L (ref 136–145)
WBC # BLD AUTO: 13.6 K/MM3 (ref 4.8–10.8)

## 2020-10-26 VITALS — SYSTOLIC BLOOD PRESSURE: 110 MMHG | DIASTOLIC BLOOD PRESSURE: 66 MMHG

## 2020-10-26 VITALS — SYSTOLIC BLOOD PRESSURE: 109 MMHG | DIASTOLIC BLOOD PRESSURE: 48 MMHG

## 2020-10-26 VITALS — DIASTOLIC BLOOD PRESSURE: 61 MMHG | SYSTOLIC BLOOD PRESSURE: 124 MMHG

## 2020-10-26 VITALS — DIASTOLIC BLOOD PRESSURE: 57 MMHG | SYSTOLIC BLOOD PRESSURE: 120 MMHG

## 2020-10-26 VITALS — SYSTOLIC BLOOD PRESSURE: 154 MMHG | DIASTOLIC BLOOD PRESSURE: 68 MMHG

## 2020-10-26 VITALS — DIASTOLIC BLOOD PRESSURE: 76 MMHG | SYSTOLIC BLOOD PRESSURE: 109 MMHG

## 2020-10-27 VITALS — SYSTOLIC BLOOD PRESSURE: 145 MMHG | DIASTOLIC BLOOD PRESSURE: 69 MMHG

## 2020-10-27 VITALS — SYSTOLIC BLOOD PRESSURE: 117 MMHG | DIASTOLIC BLOOD PRESSURE: 66 MMHG

## 2020-10-27 VITALS — SYSTOLIC BLOOD PRESSURE: 123 MMHG | DIASTOLIC BLOOD PRESSURE: 63 MMHG

## 2020-10-27 VITALS — DIASTOLIC BLOOD PRESSURE: 63 MMHG | SYSTOLIC BLOOD PRESSURE: 119 MMHG

## 2020-10-27 VITALS — SYSTOLIC BLOOD PRESSURE: 130 MMHG | DIASTOLIC BLOOD PRESSURE: 77 MMHG

## 2020-10-27 VITALS — SYSTOLIC BLOOD PRESSURE: 130 MMHG | DIASTOLIC BLOOD PRESSURE: 73 MMHG

## 2020-10-27 LAB
CALCIUM SERPL-MCNC: 8.5 MG/DL (ref 8.3–10.5)
CO2 SERPL-SCNC: 21 MMOL/L (ref 23–31)
ERYTHROCYTE [DISTWIDTH] IN BLOOD BY AUTOMATED COUNT: 15 % (ref 11.5–14.5)
GLUCOSE SERPL-MCNC: 148 MG/DL (ref 75–110)
HCT VFR BLD AUTO: 30.1 % (ref 42–52)
HGB BLD-MCNC: 9.6 G/DL (ref 13.5–18)
LYMPHOCYTES NFR BLD MANUAL: 12 % (ref 20–51)
MCH RBC QN AUTO: 29 PG (ref 27–31)
MCHC RBC AUTO-ENTMCNC: 32 G/DL (ref 33–37)
MCV RBC AUTO: 91 FL (ref 78–100)
MONOCYTES NFR BLD: 3 % (ref 3–10)
NEUTS BAND NFR BLD: 4 % (ref 0–10)
NEUTS SEG NFR BLD MANUAL: 81 % (ref 42–75)
PLATELET # BLD AUTO: 194 K/MM3 (ref 130–400)
PMV BLD AUTO: 10.3 FL (ref 7.4–10.4)
POTASSIUM SERPL-SCNC: 4 MMOL/L (ref 3.5–5.1)
RBC # BLD AUTO: 3.31 M/MM3 (ref 4.2–5.6)
SODIUM SERPL-SCNC: 139 MMOL/L (ref 136–145)
TOXIC GRANULES BLD QL SMEAR: PRESENT
WBC # BLD AUTO: 11.9 K/MM3 (ref 4.8–10.8)

## 2020-10-28 VITALS — DIASTOLIC BLOOD PRESSURE: 68 MMHG | SYSTOLIC BLOOD PRESSURE: 116 MMHG

## 2020-10-28 VITALS — DIASTOLIC BLOOD PRESSURE: 72 MMHG | SYSTOLIC BLOOD PRESSURE: 138 MMHG

## 2020-10-28 VITALS — DIASTOLIC BLOOD PRESSURE: 64 MMHG | SYSTOLIC BLOOD PRESSURE: 136 MMHG

## 2020-10-28 VITALS — DIASTOLIC BLOOD PRESSURE: 74 MMHG | SYSTOLIC BLOOD PRESSURE: 126 MMHG

## 2020-10-28 VITALS — DIASTOLIC BLOOD PRESSURE: 65 MMHG | SYSTOLIC BLOOD PRESSURE: 137 MMHG

## 2020-10-28 VITALS — SYSTOLIC BLOOD PRESSURE: 136 MMHG | DIASTOLIC BLOOD PRESSURE: 74 MMHG

## 2020-10-29 VITALS — DIASTOLIC BLOOD PRESSURE: 68 MMHG | SYSTOLIC BLOOD PRESSURE: 125 MMHG

## 2020-10-29 VITALS — DIASTOLIC BLOOD PRESSURE: 73 MMHG | SYSTOLIC BLOOD PRESSURE: 153 MMHG

## 2020-10-29 VITALS — SYSTOLIC BLOOD PRESSURE: 161 MMHG | DIASTOLIC BLOOD PRESSURE: 72 MMHG

## 2020-10-29 VITALS — DIASTOLIC BLOOD PRESSURE: 75 MMHG | SYSTOLIC BLOOD PRESSURE: 161 MMHG

## 2020-10-29 VITALS — SYSTOLIC BLOOD PRESSURE: 167 MMHG | DIASTOLIC BLOOD PRESSURE: 77 MMHG

## 2020-10-29 VITALS — SYSTOLIC BLOOD PRESSURE: 137 MMHG | DIASTOLIC BLOOD PRESSURE: 74 MMHG

## 2020-10-29 LAB
CALCIUM SERPL-MCNC: 8.2 MG/DL (ref 8.3–10.5)
CO2 SERPL-SCNC: 24 MMOL/L (ref 23–31)
ERYTHROCYTE [DISTWIDTH] IN BLOOD BY AUTOMATED COUNT: 14.9 % (ref 11.5–14.5)
GLUCOSE SERPL-MCNC: 117 MG/DL (ref 75–110)
HCT VFR BLD AUTO: 32.6 % (ref 42–52)
HGB BLD-MCNC: 10.4 G/DL (ref 13.5–18)
LYMPHOCYTES NFR BLD MANUAL: 10 % (ref 20–51)
MCH RBC QN AUTO: 29 PG (ref 27–31)
MCHC RBC AUTO-ENTMCNC: 32 G/DL (ref 33–37)
MCV RBC AUTO: 92 FL (ref 78–100)
MONOCYTES NFR BLD: 5 % (ref 3–10)
NEUTS BAND NFR BLD: 1 % (ref 0–10)
NEUTS SEG NFR BLD MANUAL: 84 % (ref 42–75)
PLATELET # BLD AUTO: 223 K/MM3 (ref 130–400)
PMV BLD AUTO: 9.8 FL (ref 7.4–10.4)
POTASSIUM SERPL-SCNC: 4 MMOL/L (ref 3.5–5.1)
RBC # BLD AUTO: 3.55 M/MM3 (ref 4.2–5.6)
SODIUM SERPL-SCNC: 143 MMOL/L (ref 136–145)
TARGETS BLD QL SMEAR: (no result)
WBC # BLD AUTO: 10.3 K/MM3 (ref 4.8–10.8)

## 2020-10-30 VITALS — DIASTOLIC BLOOD PRESSURE: 74 MMHG | SYSTOLIC BLOOD PRESSURE: 121 MMHG

## 2020-10-30 VITALS — DIASTOLIC BLOOD PRESSURE: 71 MMHG | SYSTOLIC BLOOD PRESSURE: 134 MMHG

## 2020-10-30 VITALS — SYSTOLIC BLOOD PRESSURE: 130 MMHG | DIASTOLIC BLOOD PRESSURE: 78 MMHG

## 2020-10-30 VITALS — DIASTOLIC BLOOD PRESSURE: 79 MMHG | SYSTOLIC BLOOD PRESSURE: 156 MMHG

## 2020-10-30 VITALS — DIASTOLIC BLOOD PRESSURE: 70 MMHG | SYSTOLIC BLOOD PRESSURE: 155 MMHG

## 2020-10-30 VITALS — DIASTOLIC BLOOD PRESSURE: 56 MMHG | SYSTOLIC BLOOD PRESSURE: 116 MMHG

## 2020-10-30 LAB
APPEARANCE UR: CLEAR
BASOPHILS # BLD: 0 10*3/UL (ref 0.02–0.1)
BILIRUB UR QL STRIP.AUTO: NEGATIVE
CALCIUM SERPL-MCNC: 8.2 MG/DL (ref 8.3–10.5)
CO2 SERPL-SCNC: 25 MMOL/L (ref 23–31)
COLOR UR AUTO: YELLOW
EOSINOPHIL # BLD: 0 10*3/UL (ref 0.04–0.4)
EOSINOPHIL NFR BLD: 0.1 % (ref 0–4)
ERYTHROCYTE [DISTWIDTH] IN BLOOD BY AUTOMATED COUNT: 14.6 % (ref 11.5–14.5)
GLUCOSE SERPL-MCNC: 103 MG/DL (ref 75–110)
GLUCOSE UR QL STRIP.AUTO: NEGATIVE MG/DL
HCT VFR BLD AUTO: 35.5 % (ref 42–52)
HGB BLD-MCNC: 11.3 G/DL (ref 13.5–18)
KETONES UR QL STRIP.AUTO: NEGATIVE
LEUKOCYTE ESTERASE UR QL STRIP: NEGATIVE
LYMPHOCYTES # BLD: 1.6 10*3/UL (ref 1.5–4)
MCH RBC QN AUTO: 29 PG (ref 27–31)
MCHC RBC AUTO-ENTMCNC: 32 G/DL (ref 33–37)
MCV RBC AUTO: 91 FL (ref 78–100)
MONOCYTES # BLD: 1 10*3/UL (ref 0.2–0.8)
MUCOUS THREADS URNS QL MICRO: PRESENT
NEUTROPHILS # BLD: 8.8 10*3/UL (ref 1.4–6.5)
NITRITE UR QL STRIP: NEGATIVE
PH UR STRIP.AUTO: 5 [PH] (ref 5–8)
PLATELET # BLD AUTO: 254 K/MM3 (ref 130–400)
PMV BLD AUTO: 9.9 FL (ref 7.4–10.4)
POTASSIUM SERPL-SCNC: 4.4 MMOL/L (ref 3.5–5.1)
PROT ?TM UR-MCNC: (no result) MG/DL
RBC # BLD AUTO: 3.91 M/MM3 (ref 4.2–5.6)
RBC UR QL AUTO: (no result)
SODIUM SERPL-SCNC: 140 MMOL/L (ref 136–145)
SP GR UR STRIP.AUTO: 1.02 (ref 1–1.03)
UROBILINOGEN UR-MCNC: NORMAL MG/DL
WBC # BLD AUTO: 12.1 K/MM3 (ref 4.8–10.8)
WBC #/AREA URNS HPF: (no result) /HPF (ref 0–3)

## 2020-10-31 VITALS — DIASTOLIC BLOOD PRESSURE: 46 MMHG | SYSTOLIC BLOOD PRESSURE: 100 MMHG

## 2020-10-31 VITALS — SYSTOLIC BLOOD PRESSURE: 102 MMHG | DIASTOLIC BLOOD PRESSURE: 54 MMHG

## 2020-10-31 VITALS — DIASTOLIC BLOOD PRESSURE: 85 MMHG | SYSTOLIC BLOOD PRESSURE: 143 MMHG

## 2020-10-31 VITALS — SYSTOLIC BLOOD PRESSURE: 121 MMHG | DIASTOLIC BLOOD PRESSURE: 56 MMHG

## 2020-10-31 VITALS — SYSTOLIC BLOOD PRESSURE: 158 MMHG | DIASTOLIC BLOOD PRESSURE: 79 MMHG

## 2020-10-31 VITALS — DIASTOLIC BLOOD PRESSURE: 64 MMHG | SYSTOLIC BLOOD PRESSURE: 120 MMHG

## 2020-11-01 VITALS — SYSTOLIC BLOOD PRESSURE: 161 MMHG | DIASTOLIC BLOOD PRESSURE: 84 MMHG

## 2020-11-01 VITALS — DIASTOLIC BLOOD PRESSURE: 70 MMHG | SYSTOLIC BLOOD PRESSURE: 132 MMHG

## 2020-11-01 VITALS — DIASTOLIC BLOOD PRESSURE: 68 MMHG | SYSTOLIC BLOOD PRESSURE: 119 MMHG

## 2020-11-01 VITALS — DIASTOLIC BLOOD PRESSURE: 69 MMHG | SYSTOLIC BLOOD PRESSURE: 113 MMHG

## 2020-11-01 VITALS — DIASTOLIC BLOOD PRESSURE: 84 MMHG | SYSTOLIC BLOOD PRESSURE: 130 MMHG

## 2020-11-01 VITALS — SYSTOLIC BLOOD PRESSURE: 108 MMHG | DIASTOLIC BLOOD PRESSURE: 71 MMHG

## 2020-11-02 VITALS
DIASTOLIC BLOOD PRESSURE: 65 MMHG | DIASTOLIC BLOOD PRESSURE: 67 MMHG | SYSTOLIC BLOOD PRESSURE: 101 MMHG | DIASTOLIC BLOOD PRESSURE: 65 MMHG | SYSTOLIC BLOOD PRESSURE: 101 MMHG | SYSTOLIC BLOOD PRESSURE: 101 MMHG

## 2020-11-02 VITALS — DIASTOLIC BLOOD PRESSURE: 65 MMHG | SYSTOLIC BLOOD PRESSURE: 101 MMHG

## 2020-11-02 VITALS — DIASTOLIC BLOOD PRESSURE: 68 MMHG | SYSTOLIC BLOOD PRESSURE: 146 MMHG

## 2020-11-02 VITALS — DIASTOLIC BLOOD PRESSURE: 79 MMHG | SYSTOLIC BLOOD PRESSURE: 137 MMHG

## 2020-11-02 LAB
BASOPHILS # BLD: 0 10*3/UL (ref 0.02–0.1)
CALCIUM SERPL-MCNC: 8 MG/DL (ref 8.3–10.5)
CO2 SERPL-SCNC: 27 MMOL/L (ref 23–31)
EOSINOPHIL # BLD: 0.2 10*3/UL (ref 0.04–0.4)
EOSINOPHIL NFR BLD: 2.8 % (ref 0–4)
ERYTHROCYTE [DISTWIDTH] IN BLOOD BY AUTOMATED COUNT: 15.2 % (ref 11.5–14.5)
GLUCOSE SERPL-MCNC: 80 MG/DL (ref 75–110)
HCT VFR BLD AUTO: 35.1 % (ref 42–52)
HGB BLD-MCNC: 11 G/DL (ref 13.5–18)
LYMPHOCYTES # BLD: 1.5 10*3/UL (ref 1.5–4)
MCH RBC QN AUTO: 29 PG (ref 27–31)
MCHC RBC AUTO-ENTMCNC: 31 G/DL (ref 33–37)
MCV RBC AUTO: 92 FL (ref 78–100)
MONOCYTES # BLD: 0.8 10*3/UL (ref 0.2–0.8)
NEUTROPHILS # BLD: 4.9 10*3/UL (ref 1.4–6.5)
PLATELET # BLD AUTO: 239 K/MM3 (ref 130–400)
PMV BLD AUTO: 9.8 FL (ref 7.4–10.4)
POTASSIUM SERPL-SCNC: 3.8 MMOL/L (ref 3.5–5.1)
RBC # BLD AUTO: 3.82 M/MM3 (ref 4.2–5.6)
SODIUM SERPL-SCNC: 139 MMOL/L (ref 136–145)
WBC # BLD AUTO: 8 K/MM3 (ref 4.8–10.8)

## 2020-11-27 ENCOUNTER — HOSPITAL ENCOUNTER (OUTPATIENT)
Dept: HOSPITAL 6 - LAB | Age: 85
End: 2020-11-27
Attending: INTERNAL MEDICINE
Payer: MEDICARE

## 2020-11-27 DIAGNOSIS — J69.0: Primary | ICD-10-CM

## 2020-11-27 LAB
ALBUMIN SERPL-MCNC: 3.6 G/DL (ref 3.4–4.8)
ALT SERPL-CCNC: 26 U/L (ref 0–55)
AST SERPL-CCNC: 18 U/L (ref 5–34)
BILIRUB SERPL-MCNC: 0.2 MG/DL (ref 0.2–1.2)
CALCIUM SERPL-MCNC: 8.9 MG/DL (ref 8.3–10.5)
CO2 SERPL-SCNC: 28 MMOL/L (ref 23–31)
ERYTHROCYTE [DISTWIDTH] IN BLOOD BY AUTOMATED COUNT: 13.3 % (ref 11.5–14.5)
GLUCOSE SERPL-MCNC: 85 MG/DL (ref 75–110)
HCT VFR BLD AUTO: 39.3 % (ref 42–52)
HGB BLD-MCNC: 12.3 G/DL (ref 13.5–18)
LYMPHOCYTES NFR BLD MANUAL: 30 % (ref 20–51)
MCH RBC QN AUTO: 29 PG (ref 27–31)
MCHC RBC AUTO-ENTMCNC: 31 G/DL (ref 33–37)
MCV RBC AUTO: 94 FL (ref 78–100)
MONOCYTES NFR BLD: 19 % (ref 3–10)
NEUTS SEG NFR BLD MANUAL: 48 % (ref 42–75)
PLATELET # BLD AUTO: 285 K/MM3 (ref 130–400)
PMV BLD AUTO: 9.8 FL (ref 7.4–10.4)
POTASSIUM SERPL-SCNC: 4.1 MMOL/L (ref 3.5–5.1)
PROT SERPL-MCNC: 6.9 G/DL (ref 6.2–8.1)
RBC # BLD AUTO: 4.19 M/MM3 (ref 4.2–5.6)
SODIUM SERPL-SCNC: 140 MMOL/L (ref 136–145)
WBC # BLD AUTO: 6.2 K/MM3 (ref 4.8–10.8)

## 2021-01-06 ENCOUNTER — HOSPITAL ENCOUNTER (OUTPATIENT)
Dept: HOSPITAL 6 - LAB | Age: 86
End: 2021-01-06
Attending: INTERNAL MEDICINE
Payer: MEDICARE

## 2021-01-06 DIAGNOSIS — Z01.89: ICD-10-CM

## 2021-01-06 DIAGNOSIS — D64.9: Primary | ICD-10-CM

## 2021-01-06 LAB
ALBUMIN SERPL-MCNC: 3.4 G/DL (ref 3.4–4.8)
ALT SERPL-CCNC: 13 U/L (ref 0–55)
AST SERPL-CCNC: 16 U/L (ref 5–34)
BILIRUB SERPL-MCNC: 0.3 MG/DL (ref 0.2–1.2)
CALCIUM SERPL-MCNC: 8.5 MG/DL (ref 8.3–10.5)
CO2 SERPL-SCNC: 25 MMOL/L (ref 23–31)
ERYTHROCYTE [DISTWIDTH] IN BLOOD BY AUTOMATED COUNT: 12.8 % (ref 11.5–14.5)
GLUCOSE SERPL-MCNC: 85 MG/DL (ref 75–110)
HCT VFR BLD AUTO: 36.4 % (ref 42–52)
HGB BLD-MCNC: 11.5 G/DL (ref 13.5–18)
LYMPHOCYTES NFR BLD MANUAL: 32 % (ref 20–51)
MCH RBC QN AUTO: 29 PG (ref 27–31)
MCHC RBC AUTO-ENTMCNC: 32 G/DL (ref 33–37)
MCV RBC AUTO: 92 FL (ref 78–100)
MONOCYTES NFR BLD: 14 % (ref 3–10)
NEUTS SEG NFR BLD MANUAL: 44 % (ref 42–75)
PLATELET # BLD AUTO: 174 K/MM3 (ref 130–400)
PMV BLD AUTO: 10.2 FL (ref 7.4–10.4)
POTASSIUM SERPL-SCNC: 3.8 MMOL/L (ref 3.5–5.1)
PROT SERPL-MCNC: 6 G/DL (ref 6.2–8.1)
RBC # BLD AUTO: 3.95 M/MM3 (ref 4.2–5.6)
SODIUM SERPL-SCNC: 140 MMOL/L (ref 136–145)
WBC # BLD AUTO: 6.8 K/MM3 (ref 4.8–10.8)

## 2022-04-18 ENCOUNTER — HOSPITAL ENCOUNTER (OUTPATIENT)
Dept: HOSPITAL 6 - MSO | Age: 87
Discharge: HOME | End: 2022-04-18
Payer: MEDICARE

## 2022-04-18 DIAGNOSIS — C44.329: Primary | ICD-10-CM
